# Patient Record
Sex: MALE | Race: WHITE | NOT HISPANIC OR LATINO | URBAN - METROPOLITAN AREA
[De-identification: names, ages, dates, MRNs, and addresses within clinical notes are randomized per-mention and may not be internally consistent; named-entity substitution may affect disease eponyms.]

---

## 2021-05-02 ENCOUNTER — HOSPITAL ENCOUNTER (OUTPATIENT)
Dept: RADIOLOGY | Facility: MEDICAL CENTER | Age: 71
End: 2021-05-02
Attending: FAMILY MEDICINE
Payer: COMMERCIAL

## 2021-05-02 DIAGNOSIS — K40.90 UNILATERAL INGUINAL HERNIA WITHOUT OBSTRUCTION OR GANGRENE, RECURRENCE NOT SPECIFIED: ICD-10-CM

## 2021-05-02 PROCEDURE — 76857 US EXAM PELVIC LIMITED: CPT

## 2021-09-21 ENCOUNTER — HOSPITAL ENCOUNTER (OUTPATIENT)
Dept: LAB | Facility: MEDICAL CENTER | Age: 71
End: 2021-09-21
Attending: FAMILY MEDICINE
Payer: COMMERCIAL

## 2021-09-21 LAB
ALBUMIN SERPL BCP-MCNC: 4.5 G/DL (ref 3.2–4.9)
ALBUMIN/GLOB SERPL: 1.6 G/DL
ALP SERPL-CCNC: 104 U/L (ref 30–99)
ALT SERPL-CCNC: 14 U/L (ref 2–50)
ANION GAP SERPL CALC-SCNC: 12 MMOL/L (ref 7–16)
AST SERPL-CCNC: 22 U/L (ref 12–45)
BILIRUB SERPL-MCNC: 1.3 MG/DL (ref 0.1–1.5)
BUN SERPL-MCNC: 13 MG/DL (ref 8–22)
CALCIUM SERPL-MCNC: 9.7 MG/DL (ref 8.5–10.5)
CHLORIDE SERPL-SCNC: 100 MMOL/L (ref 96–112)
CHOLEST SERPL-MCNC: 177 MG/DL (ref 100–199)
CO2 SERPL-SCNC: 26 MMOL/L (ref 20–33)
CREAT SERPL-MCNC: 0.57 MG/DL (ref 0.5–1.4)
FASTING STATUS PATIENT QL REPORTED: NORMAL
GLOBULIN SER CALC-MCNC: 2.9 G/DL (ref 1.9–3.5)
GLUCOSE SERPL-MCNC: 93 MG/DL (ref 65–99)
HDLC SERPL-MCNC: 36 MG/DL
LDLC SERPL CALC-MCNC: 119 MG/DL
POTASSIUM SERPL-SCNC: 3.9 MMOL/L (ref 3.6–5.5)
PROT SERPL-MCNC: 7.4 G/DL (ref 6–8.2)
SODIUM SERPL-SCNC: 138 MMOL/L (ref 135–145)
TRIGL SERPL-MCNC: 112 MG/DL (ref 0–149)

## 2021-09-21 PROCEDURE — 80061 LIPID PANEL: CPT

## 2021-09-21 PROCEDURE — 80053 COMPREHEN METABOLIC PANEL: CPT

## 2021-09-21 PROCEDURE — 36415 COLL VENOUS BLD VENIPUNCTURE: CPT

## 2022-09-07 ENCOUNTER — HOSPITAL ENCOUNTER (OUTPATIENT)
Dept: LAB | Facility: MEDICAL CENTER | Age: 72
End: 2022-09-07
Attending: FAMILY MEDICINE
Payer: COMMERCIAL

## 2022-09-07 LAB
ALBUMIN SERPL BCP-MCNC: 4.6 G/DL (ref 3.2–4.9)
ALBUMIN/GLOB SERPL: 1.8 G/DL
ALP SERPL-CCNC: 114 U/L (ref 30–99)
ALT SERPL-CCNC: 14 U/L (ref 2–50)
ANION GAP SERPL CALC-SCNC: 8 MMOL/L (ref 7–16)
AST SERPL-CCNC: 19 U/L (ref 12–45)
BILIRUB SERPL-MCNC: 1.6 MG/DL (ref 0.1–1.5)
BUN SERPL-MCNC: 17 MG/DL (ref 8–22)
CALCIUM SERPL-MCNC: 9.3 MG/DL (ref 8.5–10.5)
CHLORIDE SERPL-SCNC: 103 MMOL/L (ref 96–112)
CHOLEST SERPL-MCNC: 164 MG/DL (ref 100–199)
CO2 SERPL-SCNC: 28 MMOL/L (ref 20–33)
CREAT SERPL-MCNC: 0.64 MG/DL (ref 0.5–1.4)
FASTING STATUS PATIENT QL REPORTED: NORMAL
GFR SERPLBLD CREATININE-BSD FMLA CKD-EPI: 101 ML/MIN/1.73 M 2
GLOBULIN SER CALC-MCNC: 2.5 G/DL (ref 1.9–3.5)
GLUCOSE SERPL-MCNC: 111 MG/DL (ref 65–99)
HDLC SERPL-MCNC: 39 MG/DL
LDLC SERPL CALC-MCNC: 100 MG/DL
POTASSIUM SERPL-SCNC: 4.2 MMOL/L (ref 3.6–5.5)
PROT SERPL-MCNC: 7.1 G/DL (ref 6–8.2)
SODIUM SERPL-SCNC: 139 MMOL/L (ref 135–145)
TRIGL SERPL-MCNC: 126 MG/DL (ref 0–149)

## 2022-09-07 PROCEDURE — 80061 LIPID PANEL: CPT

## 2022-09-07 PROCEDURE — 36415 COLL VENOUS BLD VENIPUNCTURE: CPT

## 2022-09-07 PROCEDURE — 80053 COMPREHEN METABOLIC PANEL: CPT

## 2023-06-30 ENCOUNTER — HOSPITAL ENCOUNTER (OUTPATIENT)
Dept: LAB | Facility: MEDICAL CENTER | Age: 73
End: 2023-06-30
Attending: FAMILY MEDICINE
Payer: COMMERCIAL

## 2023-06-30 LAB
ALBUMIN SERPL BCP-MCNC: 4.2 G/DL (ref 3.2–4.9)
ALBUMIN/GLOB SERPL: 1.6 G/DL
ALP SERPL-CCNC: 106 U/L (ref 30–99)
ALT SERPL-CCNC: 14 U/L (ref 2–50)
ANION GAP SERPL CALC-SCNC: 11 MMOL/L (ref 7–16)
AST SERPL-CCNC: 17 U/L (ref 12–45)
BILIRUB SERPL-MCNC: 1.9 MG/DL (ref 0.1–1.5)
BUN SERPL-MCNC: 22 MG/DL (ref 8–22)
CALCIUM ALBUM COR SERPL-MCNC: 8.9 MG/DL (ref 8.5–10.5)
CALCIUM SERPL-MCNC: 9.1 MG/DL (ref 8.5–10.5)
CHLORIDE SERPL-SCNC: 105 MMOL/L (ref 96–112)
CHOLEST SERPL-MCNC: 156 MG/DL (ref 100–199)
CO2 SERPL-SCNC: 24 MMOL/L (ref 20–33)
CREAT SERPL-MCNC: 0.75 MG/DL (ref 0.5–1.4)
FASTING STATUS PATIENT QL REPORTED: NORMAL
GFR SERPLBLD CREATININE-BSD FMLA CKD-EPI: 95 ML/MIN/1.73 M 2
GLOBULIN SER CALC-MCNC: 2.7 G/DL (ref 1.9–3.5)
GLUCOSE SERPL-MCNC: 119 MG/DL (ref 65–99)
HDLC SERPL-MCNC: 35 MG/DL
LDLC SERPL CALC-MCNC: 103 MG/DL
POTASSIUM SERPL-SCNC: 4 MMOL/L (ref 3.6–5.5)
PROT SERPL-MCNC: 6.9 G/DL (ref 6–8.2)
SODIUM SERPL-SCNC: 140 MMOL/L (ref 135–145)
TRIGL SERPL-MCNC: 89 MG/DL (ref 0–149)

## 2023-06-30 PROCEDURE — 80053 COMPREHEN METABOLIC PANEL: CPT

## 2023-06-30 PROCEDURE — 80061 LIPID PANEL: CPT

## 2023-06-30 PROCEDURE — 36415 COLL VENOUS BLD VENIPUNCTURE: CPT

## 2023-07-17 PROBLEM — M18.12 PRIMARY OSTEOARTHRITIS OF FIRST CARPOMETACARPAL JOINT OF LEFT HAND: Status: ACTIVE | Noted: 2023-07-17

## 2023-07-17 PROBLEM — M19.042 OSTEOARTHRITIS OF LEFT HAND: Status: ACTIVE | Noted: 2023-07-17

## 2023-07-17 PROBLEM — M65.4 DE QUERVAIN'S TENOSYNOVITIS, LEFT: Status: ACTIVE | Noted: 2023-07-17

## 2024-07-02 ENCOUNTER — HOSPITAL ENCOUNTER (OUTPATIENT)
Dept: LAB | Facility: MEDICAL CENTER | Age: 74
End: 2024-07-02
Attending: FAMILY MEDICINE
Payer: COMMERCIAL

## 2024-07-02 LAB
ALBUMIN SERPL BCP-MCNC: 4.3 G/DL (ref 3.2–4.9)
ALBUMIN/GLOB SERPL: 1.5 G/DL
ALP SERPL-CCNC: 100 U/L (ref 30–99)
ALT SERPL-CCNC: 16 U/L (ref 2–50)
ANION GAP SERPL CALC-SCNC: 11 MMOL/L (ref 7–16)
AST SERPL-CCNC: 18 U/L (ref 12–45)
BILIRUB SERPL-MCNC: 1.3 MG/DL (ref 0.1–1.5)
BUN SERPL-MCNC: 15 MG/DL (ref 8–22)
CALCIUM ALBUM COR SERPL-MCNC: 9.2 MG/DL (ref 8.5–10.5)
CALCIUM SERPL-MCNC: 9.4 MG/DL (ref 8.5–10.5)
CHLORIDE SERPL-SCNC: 104 MMOL/L (ref 96–112)
CHOLEST SERPL-MCNC: 171 MG/DL (ref 100–199)
CO2 SERPL-SCNC: 25 MMOL/L (ref 20–33)
CREAT SERPL-MCNC: 0.69 MG/DL (ref 0.5–1.4)
FASTING STATUS PATIENT QL REPORTED: NORMAL
GFR SERPLBLD CREATININE-BSD FMLA CKD-EPI: 97 ML/MIN/1.73 M 2
GLOBULIN SER CALC-MCNC: 2.8 G/DL (ref 1.9–3.5)
GLUCOSE SERPL-MCNC: 110 MG/DL (ref 65–99)
HDLC SERPL-MCNC: 47 MG/DL
LDLC SERPL CALC-MCNC: 108 MG/DL
POTASSIUM SERPL-SCNC: 3.9 MMOL/L (ref 3.6–5.5)
PROT SERPL-MCNC: 7.1 G/DL (ref 6–8.2)
SODIUM SERPL-SCNC: 140 MMOL/L (ref 135–145)
TRIGL SERPL-MCNC: 80 MG/DL (ref 0–149)

## 2024-07-02 PROCEDURE — 36415 COLL VENOUS BLD VENIPUNCTURE: CPT

## 2024-07-02 PROCEDURE — 80053 COMPREHEN METABOLIC PANEL: CPT

## 2024-07-02 PROCEDURE — 80061 LIPID PANEL: CPT

## 2024-08-24 ENCOUNTER — OFFICE VISIT (OUTPATIENT)
Dept: URGENT CARE | Facility: PHYSICIAN GROUP | Age: 74
End: 2024-08-24
Payer: COMMERCIAL

## 2024-08-24 VITALS
OXYGEN SATURATION: 94 % | BODY MASS INDEX: 27.22 KG/M2 | SYSTOLIC BLOOD PRESSURE: 142 MMHG | DIASTOLIC BLOOD PRESSURE: 86 MMHG | HEIGHT: 69 IN | TEMPERATURE: 96.7 F | HEART RATE: 64 BPM | WEIGHT: 183.8 LBS | RESPIRATION RATE: 16 BRPM

## 2024-08-24 DIAGNOSIS — R04.0 EPISTAXIS: ICD-10-CM

## 2024-08-24 DIAGNOSIS — J34.89 NOSE DRYNESS: ICD-10-CM

## 2024-08-24 PROCEDURE — 99203 OFFICE O/P NEW LOW 30 MIN: CPT | Performed by: FAMILY MEDICINE

## 2024-08-24 PROCEDURE — 3077F SYST BP >= 140 MM HG: CPT | Performed by: FAMILY MEDICINE

## 2024-08-24 PROCEDURE — 3079F DIAST BP 80-89 MM HG: CPT | Performed by: FAMILY MEDICINE

## 2024-08-24 RX ORDER — BACITRACIN ZINC AND POLYMYXIN B SULFATE 500; 1000 [USP'U]/G; [USP'U]/G
OINTMENT TOPICAL ONCE
Status: COMPLETED | OUTPATIENT
Start: 2024-08-24 | End: 2024-08-24

## 2024-08-24 RX ADMIN — BACITRACIN ZINC AND POLYMYXIN B SULFATE 1 EACH: 500; 1000 OINTMENT TOPICAL at 09:59

## 2024-08-24 ASSESSMENT — ENCOUNTER SYMPTOMS
DIZZINESS: 0
FEVER: 0
COUGH: 0
SHORTNESS OF BREATH: 0
MYALGIAS: 0
SORE THROAT: 0
VOMITING: 0
CHILLS: 0
NAUSEA: 0

## 2024-08-24 NOTE — PROGRESS NOTES
Subjective:   Isaias Kim is a 74 y.o. male who presents for Epistaxis (Left nostril, got dizzy x last night )        Epistaxis   The bleeding has been from the left (Reports nosebleed, onset previous night, initially resolved then recurred this morning then resolved second time, no active bleeding at this time) nare. This is a new problem. The current episode started yesterday. The problem has been waxing and waning. The bleeding is associated with dry air. He has tried nothing for the symptoms.     PMH:  has no past medical history on file.  MEDS:   Current Outpatient Medications:     amlodipine-valsartan (EXFORGE)  MG per tablet, Take 1 Tablet by mouth every day., Disp: , Rfl:     propranolol CR (INDERAL LA) 160 MG CAPSULE SR 24 HR capsule, Take 1 Capsule by mouth every day., Disp: , Rfl:     tamsulosin (FLOMAX) 0.4 MG capsule, Take 1 Capsule by mouth every day., Disp: , Rfl:     amlodipine-valsartan (EXFORGE)  MG per tablet, , Disp: , Rfl:     propranolol CR (INDERAL LA) 160 MG CAPSULE SR 24 HR capsule, , Disp: , Rfl:     tamsulosin (FLOMAX) 0.4 MG capsule, Take 0.4 mg by mouth every day. (Patient not taking: Reported on 8/24/2024), Disp: , Rfl:     Current Facility-Administered Medications:     bacitracin-polymyxin b (Polysporin) 500-32023 UNIT/GM ointment, , Topical, Once,   ALLERGIES: No Known Allergies  SURGHX: History reviewed. No pertinent surgical history.  SOCHX:  reports that he has never smoked. He has never been exposed to tobacco smoke. He has never used smokeless tobacco.  FH: History reviewed. No pertinent family history.  Review of Systems   Constitutional:  Negative for chills and fever.   HENT:  Positive for nosebleeds. Negative for sore throat.    Respiratory:  Negative for cough and shortness of breath.    Gastrointestinal:  Negative for nausea and vomiting.   Musculoskeletal:  Negative for myalgias.   Skin:  Negative for rash.   Neurological:  Negative for dizziness.         "Objective:   BP (!) 142/86   Pulse 64   Temp 35.9 °C (96.7 °F) (Temporal)   Resp 16   Ht 1.753 m (5' 9\")   Wt 83.4 kg (183 lb 12.8 oz)   SpO2 94%   BMI 27.14 kg/m²   Physical Exam  Vitals and nursing note reviewed.   Constitutional:       General: He is not in acute distress.     Appearance: He is well-developed.   HENT:      Head: Normocephalic and atraumatic.      Right Ear: External ear normal.      Left Ear: External ear normal.      Nose: Nose normal.      Right Nostril: No epistaxis or septal hematoma.      Left Nostril: No epistaxis or septal hematoma.      Comments: Left friable mucosa noted, no active bleeding, dried eschar intact     Mouth/Throat:      Mouth: Mucous membranes are moist.   Eyes:      Conjunctiva/sclera: Conjunctivae normal.   Cardiovascular:      Rate and Rhythm: Normal rate.   Pulmonary:      Effort: Pulmonary effort is normal. No respiratory distress.      Breath sounds: Normal breath sounds.   Abdominal:      General: There is no distension.   Musculoskeletal:         General: Normal range of motion.   Skin:     General: Skin is warm and dry.   Neurological:      General: No focal deficit present.      Mental Status: He is alert and oriented to person, place, and time. Mental status is at baseline.      Gait: Gait (gait at baseline) normal.   Psychiatric:         Judgment: Judgment normal.           Assessment/Plan:   1. Epistaxis  - bacitracin-polymyxin b (Polysporin) 500-01042 UNIT/GM ointment    2. Nose dryness  - bacitracin-polymyxin b (Polysporin) 500-13914 UNIT/GM ointment        Medical Decision Making/Course:  In the course of preparing for this visit with review of the pertinent past medical history, recent and past clinic visits, current medications, and performing chart, immunization, medical history and medication reconciliation, and in the further course of obtaining the current history pertinent to the clinic visit today, performing an exam and evaluation, ordering " and independently evaluating labs, tests  , and/or procedures, prescribing any recommended new medications as noted above including application of antibiotic ointment to the left naris with instruction and education on application to repeat at home each evening, providing any pertinent counseling and education and recommending further coordination of care including recommendations for symptomatic and supportive measures, at least  18 minutes of total time were spent during this encounter.      Discussed close monitoring, return precautions, and supportive measures of maintaining adequate fluid hydration and caloric intake, relative rest and symptom management as needed for pain and/or fever.    Differential diagnosis, natural history, supportive care, and indications for immediate follow-up discussed.     Advised the patient to follow-up with the primary care physician for recheck, reevaluation, and consideration of further management.    Please note that this dictation was created using voice recognition software. I have worked with consultants from the vendor as well as technical experts from [facility] [employer] to optimize the interface. I have made every reasonable attempt to correct obvious errors, but I expect that there are errors of grammar and possibly content that I did not discover before finalizing the note.

## 2024-08-28 ENCOUNTER — HOSPITAL ENCOUNTER (EMERGENCY)
Facility: MEDICAL CENTER | Age: 74
End: 2024-08-28
Attending: EMERGENCY MEDICINE
Payer: COMMERCIAL

## 2024-08-28 VITALS
BODY MASS INDEX: 26.74 KG/M2 | SYSTOLIC BLOOD PRESSURE: 156 MMHG | RESPIRATION RATE: 14 BRPM | WEIGHT: 180.56 LBS | DIASTOLIC BLOOD PRESSURE: 68 MMHG | HEART RATE: 70 BPM | OXYGEN SATURATION: 94 % | HEIGHT: 69 IN | TEMPERATURE: 98.2 F

## 2024-08-28 DIAGNOSIS — R04.0 EPISTAXIS: ICD-10-CM

## 2024-08-28 PROCEDURE — 303620 HCHG EPISTAXIS CONTROL

## 2024-08-28 PROCEDURE — 700102 HCHG RX REV CODE 250 W/ 637 OVERRIDE(OP): Performed by: EMERGENCY MEDICINE

## 2024-08-28 PROCEDURE — A9270 NON-COVERED ITEM OR SERVICE: HCPCS | Performed by: EMERGENCY MEDICINE

## 2024-08-28 PROCEDURE — 99284 EMERGENCY DEPT VISIT MOD MDM: CPT

## 2024-08-28 PROCEDURE — 700101 HCHG RX REV CODE 250: Performed by: EMERGENCY MEDICINE

## 2024-08-28 RX ORDER — LIDOCAINE HYDROCHLORIDE AND EPINEPHRINE 10; 10 MG/ML; UG/ML
20 INJECTION, SOLUTION INFILTRATION; PERINEURAL ONCE
Status: COMPLETED | OUTPATIENT
Start: 2024-08-28 | End: 2024-08-28

## 2024-08-28 RX ORDER — AMOXICILLIN 500 MG/1
500 CAPSULE ORAL ONCE
Status: COMPLETED | OUTPATIENT
Start: 2024-08-28 | End: 2024-08-28

## 2024-08-28 RX ORDER — ACETAMINOPHEN 500 MG
1000 TABLET ORAL ONCE
Status: COMPLETED | OUTPATIENT
Start: 2024-08-28 | End: 2024-08-28

## 2024-08-28 RX ORDER — IBUPROFEN 200 MG
400 TABLET ORAL ONCE
Status: COMPLETED | OUTPATIENT
Start: 2024-08-28 | End: 2024-08-28

## 2024-08-28 RX ORDER — AMOXICILLIN 500 MG/1
500 CAPSULE ORAL 3 TIMES DAILY
Qty: 15 CAPSULE | Refills: 0 | Status: ACTIVE | OUTPATIENT
Start: 2024-08-28 | End: 2024-09-02

## 2024-08-28 RX ADMIN — LIDOCAINE HYDROCHLORIDE AND EPINEPHRINE 20 ML: 10; 10 INJECTION, SOLUTION INFILTRATION; PERINEURAL at 15:00

## 2024-08-28 RX ADMIN — ACETAMINOPHEN 1000 MG: 500 TABLET ORAL at 15:56

## 2024-08-28 RX ADMIN — AMOXICILLIN 500 MG: 500 CAPSULE ORAL at 15:56

## 2024-08-28 RX ADMIN — IBUPROFEN 400 MG: 200 TABLET, FILM COATED ORAL at 15:56

## 2024-08-28 NOTE — ED PROVIDER NOTES
"ER Provider Note    Scribed for Isaias Flores M.D. by Radha Reyes. 8/28/2024   2:32 PM    Primary Care Provider: Zaid Rodriguez D.O.    CHIEF COMPLAINT  Chief Complaint   Patient presents with    Epistaxis     Pt states they started to have nose bleed Friday night 8/23, PCP sent to ED to be seen by ENT. Pt has been using afrin     Pt denies trauma and blood thinners    Dizziness     Pt states dizziness started this morning      EXTERNAL RECORDS REVIEWED  Outpatient Notes The patient was seen by PCP 8/24/24 for a nosebleed.    HPI/ROS  LIMITATION TO HISTORY   Select: : None  OUTSIDE HISTORIAN(S):  None    Isaias Kim is a 74 y.o. male who presents to the ED for evaluation of epistaxis onset 5 days ago. The patient reports that his nosebleed started on Friday and he went to urgent care on Saturday for evaluation. He notes that they treated him with an \"ointment\" but his nosebleed returned on Monday prompting him to present to his PCP, Dr. Rodriguez. He reports that his PCP cauterized his nose 4 times but shortly after his nosebleed started again. He then presented to the ED at [facility] Tuesday where his nose was cauterized again. He presents to the ED today for continued bleeding and to hopefully consult with an ENT. He describes his nose bleeds as being heavy in nature filling \"rags full\" of blood. The patient states that after a few of his heavier nosebleeds he has had associated dizziness. He notes that he has tried nose clamps, saline rinses, Vaseline, and everything that has been recommended to him with no alleviation. 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 No alleviating or exacerbating factors were noted. He denies taking any blood thinners.     PAST MEDICAL HISTORY  History reviewed. No pertinent past medical history.    SURGICAL HISTORY  History reviewed. No pertinent surgical history.    FAMILY HISTORY  History reviewed. No pertinent family history.    SOCIAL " "HISTORY   reports that he has never smoked. He has never been exposed to tobacco smoke. He has never used smokeless tobacco. He reports that he does not currently use alcohol after a past usage of about 2.4 oz of alcohol per week. He reports that he does not use drugs.    CURRENT MEDICATIONS  Current Outpatient Medications   Medication Instructions    amlodipine-valsartan (EXFORGE)  MG per tablet 1 Tablet, Oral, DAILY    amlodipine-valsartan (EXFORGE)  MG per tablet No dose, route, or frequency recorded.    propranolol CR (INDERAL LA) 160 MG CAPSULE SR 24 HR capsule 1 Capsule, Oral, DAILY    propranolol CR (INDERAL LA) 160 MG CAPSULE SR 24 HR capsule No dose, route, or frequency recorded.    tamsulosin (FLOMAX) 0.4 MG capsule 1 Capsule, Oral, DAILY    tamsulosin (FLOMAX) 0.4 mg, DAILY      ALLERGIES  No Known Allergies     PHYSICAL EXAM  BP (!) 158/77   Pulse 84   Temp 36.7 °C (98.1 °F) (Temporal)   Resp 16   Ht 1.753 m (5' 9\")   Wt 81.9 kg (180 lb 8.9 oz)   SpO2 95%   BMI 26.66 kg/m²      Constitutional: No acute distress    HENT: Normocephalic, Atraumatic.  Oropharynx moist. Left nare with dried blood and previous area of cauterization on the anterior septum.   Eyes: EOMI, Conjunctiva normal, No discharge.   CV: Good pulses  Thorax & Lungs: No respiratory distress.   Skin: Warm, Dry, No rash noted    Musculoskeletal: No major deformities noted.   Neurologic: Awake, alert. Moves all extremities spontaneously.  Psychiatric: Affect normal, Mood normal.     DIAGNOSTIC STUDIES    Epistaxis Procedure Note    Indication: Bleeding    Pre-medication: lidocaine    Procedure: The patient was positioned appropriately and the nares were cleared as well as possible. The bleeding site was localized to the left nare in the septal region and tamponaded with an anterior rapid Rhino nasal pack .  Hemostasis was obtained.    The patient tolerated the procedure well.    Complications: None     COURSE & MEDICAL " DECISION MAKING     INITIAL ASSESSMENT, COURSE AND PLAN    Care Narrative: Patient with recurrent epistaxis who has had cautery twice.  I elected to pack the nose given his reoccurrence and him wanting to work.  I placed lidocaine 1% with epinephrine on cotton swabs and inserted into the nare, this caused it to bleed more then I packed it with a balloon packing.  The patient tolerated procedure well.  I did not get laboratory test because the patient states that he just had intermittent bleeding, he does not think that he bled enough to be anemic.  I believe the patient to be discharged home, will cover the patient with antibiotics, have the patient return in the next 3 to 4 days for packing removal.  I have taught him how to adjust the balloon.  Have the patient return with worsening symptoms.    2:32 PM - Patient was evaluated at bedside. Patient verbalizes understanding and support with my plan of care. I informed the patient that the next step is to add packing to the nose for 3 days and see if the is adequate in stopping the nose bleed.     2:58 PM - I treated the patient's epistaxis at this time as detailed above.     4:38 PM - Patient reevaluated at bedside. He feels improved. Discussed plan for discharge, including plan for follow-up, and informed them to return to the [facility] ED with any new or worsening symptoms. Patient was given the opportunity for questions, and I addressed all questions or concerns. He is stable for discharge at this time. Patient verbalizes understanding and support with my plan for discharge.    DISPOSITION AND DISCUSSIONS    I have discussed management of the patient with the following physicians and CHENCHO's:  None    Discussion of management with other [facility] or appropriate source(s): None     Escalation of care considered, and ultimately not performed: Laboratory analysis.    Barriers to care at this time, including but not limited to:  None .     The patient will return for new or  worsening symptoms and is stable at the time of discharge.    The patient is referred to a primary physician for blood pressure management, diabetic screening, and for all other preventative health concerns.    DISPOSITION:  Patient will be discharged home in stable condition.    FOLLOW UP:  [facility], Emergency Dept  [address]  [place] [id number] [phone]    Return on Saturday afternoon or Sunday morning for packing removal, return with worsening bleeding.      OUTPATIENT MEDICATIONS:  Discharge Medication List as of 8/28/2024  4:05 PM        START taking these medications    Details   amoxicillin (AMOXIL) 500 MG Cap Take 1 Capsule by mouth 3 times a day for 5 days., Disp-15 Capsule, R-0, Normal              FINAL DIAGNOSIS  1. Epistaxis      2.   Epistaxis procedure     Radha SCHERER (Scribnatalee), am scribing for, and in the presence of, Isaias Flores M.D..    Electronically signed by: Radha Reyes ([place]), 8/28/2024    IIsaias M.D. personally performed the services described in this documentation, as scribed by Radha Reyes in my presence, and it is both accurate and complete.      The note accurately reflects work and decisions made by me.  Isaias Flores M.D.  8/28/2024  9:04 PM

## 2024-08-28 NOTE — ED TRIAGE NOTES
".  Chief Complaint   Patient presents with    Epistaxis     Pt states they started to have nose bleed Friday night 8/23, PCP sent to ED to be seen by ENT. Pt has been using afrin     Pt denies trauma and blood thinners    Dizziness     Pt states dizziness started this morning    Patient ambulatory to triage for above complaint. Patient aware to notify RN of any changes in symptoms. Patient educated on triage process. A&O x 4 and placed in lobby    .BP (!) 155/83   Pulse 84   Temp 36.7 °C (98.1 °F) (Temporal)   Resp 16   Ht 1.753 m (5' 9\")   Wt 81.9 kg (180 lb 8.9 oz)   SpO2 96%   BMI 26.66 kg/m²     "

## 2024-08-28 NOTE — ED NOTES
Pt has discharge orders. Pt educated on discharge instructions and new prescriptions.  Pt educated to follow up on Saturday or Sunday for packing removal.  Pt also educated that a referral was sent for ENT and pt encouraged to call for appointment. Pt verbalizes understanding. Pt ambulatory to lobby with steady gait. Pt going home with wife.

## 2024-08-28 NOTE — DISCHARGE INSTRUCTIONS
Continue to try to keep the balloon moist with saline nasal spray and Afrin.  Try to follow-up with ENT, return on Saturday afternoon or Sunday morning for packing removal

## 2024-08-31 ENCOUNTER — HOSPITAL ENCOUNTER (EMERGENCY)
Facility: MEDICAL CENTER | Age: 74
End: 2024-08-31
Attending: EMERGENCY MEDICINE
Payer: COMMERCIAL

## 2024-08-31 VITALS
HEIGHT: 69 IN | HEART RATE: 59 BPM | OXYGEN SATURATION: 95 % | RESPIRATION RATE: 16 BRPM | SYSTOLIC BLOOD PRESSURE: 148 MMHG | BODY MASS INDEX: 26.78 KG/M2 | DIASTOLIC BLOOD PRESSURE: 71 MMHG | WEIGHT: 180.78 LBS | TEMPERATURE: 97.6 F

## 2024-08-31 DIAGNOSIS — R04.0 EPISTAXIS: ICD-10-CM

## 2024-08-31 PROCEDURE — 99282 EMERGENCY DEPT VISIT SF MDM: CPT

## 2024-08-31 ASSESSMENT — LIFESTYLE VARIABLES: DO YOU DRINK ALCOHOL: NO

## 2024-08-31 NOTE — ED PROVIDER NOTES
"ED Provider Note    CHIEF COMPLAINT  Chief Complaint   Patient presents with    Other     Pt has nasal balloon in place, was told to come back for re eval possible removal.        EXTERNAL RECORDS REVIEWED  Other patient seen in the emergency department 3 days ago and had nasal packing placed    HPI/ROS  LIMITATION TO HISTORY   Select: : None  OUTSIDE HISTORIAN(S):  Wife    Isaias Kim is a 74 y.o. male who presents stating that he was here 3 days ago with left nare epistaxis, at that time a Rhino Rocket was placed.  He said it bled yesterday and his wife was able to add some air to the balloon and that resolved the bleeding and then this morning had a small amount of oozing.    PAST MEDICAL HISTORY   The patient denies    SURGICAL HISTORY  patient denies any surgical history    FAMILY HISTORY  No family history on file.    SOCIAL HISTORY  Social History     Tobacco Use    Smoking status: Never     Passive exposure: Never    Smokeless tobacco: Never   Vaping Use    Vaping status: Never Used   Substance and Sexual Activity    Alcohol use: Not Currently     Alcohol/week: 2.4 oz     Types: 4 Shots of liquor per week    Drug use: Never    Sexual activity: Not on file       CURRENT MEDICATIONS  Home Medications       Reviewed by Louis Ellis R.N. (Registered Nurse) on 08/31/24 at 1316  Med List Status: Not Addressed     Medication Last Dose Status   amlodipine-valsartan (EXFORGE)  MG per tablet  Active   amlodipine-valsartan (EXFORGE)  MG per tablet  Active   amoxicillin (AMOXIL) 500 MG Cap  Active   propranolol CR (INDERAL LA) 160 MG CAPSULE SR 24 HR capsule  Active   propranolol CR (INDERAL LA) 160 MG CAPSULE SR 24 HR capsule  Active   tamsulosin (FLOMAX) 0.4 MG capsule  Active   tamsulosin (FLOMAX) 0.4 MG capsule  Active                    ALLERGIES  No Known Allergies    PHYSICAL EXAM  VITAL SIGNS: BP (!) 145/108   Pulse 61   Temp 36.4 °C (97.6 °F) (Temporal)   Resp 16   Ht 1.753 m (5' 9\")   " Wt 82 kg (180 lb 12.4 oz)   SpO2 97%   BMI 26.70 kg/m²      Nose: There is currently no active bleeding, Rhino Rocket is in place left nostril.          COURSE & MEDICAL DECISION MAKING    ASSESSMENT, COURSE AND PLAN  Care Narrative:     Patient presents with nasal packing in place.  He had some bleeding yesterday and some oozing today.  I have encouraged the patient to keep the packing in for at least 2 more days.  I did give him the option to have me remove it but I might have to repack the nose.  He agrees at this time to keep the packing in for 2 more days and return.  My concern is that the clot has not formed.  I have encouraged the patient to use Ponaris to moisten his nose after these nosebleeds resolved to prevent them.          ADDITIONAL PROBLEMS MANAGED  Epistaxis    DISPOSITION AND DISCUSSIONS  I have discussed management of the patient with the following physicians and CHENCHO's: None    Discussion of management with other [facility] or appropriate source(s): None     Escalation of care considered, and ultimately not performed:Laboratory analysis    Barriers to care at this time, including but not limited to:  None .     Decision tools and prescription drugs considered including, but not limited to:  instructions to return in 2 days for packing removal .       The patient will return for new or worsening symptoms and is stable at the time of discharge.    The patient is referred to a primary physician for blood pressure management, diabetic screening, and for all other preventative health concerns.        DISPOSITION:  Patient will be discharged home in stable condition.    FOLLOW UP:  [facility], Emergency Dept  [address] [id number] [phone]    If symptoms worsen, and for packing removal in 2 to 3 days from today      OUTPATIENT MEDICATIONS:  New Prescriptions    No medications on file         FINAL DIAGNOSIS  1. Epistaxis         Electronically signed by: Juan EID  Rio Briceno M.D., 8/31/2024 2:16 PM

## 2024-08-31 NOTE — ED NOTES
Dsicharged home with written and verbal education to return in 2-3 days for recheck. Educated to return for increased bleeding and/or of infection. He is ambulatory on DC.

## 2024-08-31 NOTE — ED TRIAGE NOTES
Chief Complaint   Patient presents with    Other     Pt has nasal balloon in place, was told to come back for re eval possible removal.      Explained to pt triage process, made pt aware to tell this RN/staff of any changes/concerns, pt verbalized understanding of process and instructions given. Pt to ER lobby.

## 2024-09-03 ENCOUNTER — HOSPITAL ENCOUNTER (EMERGENCY)
Facility: MEDICAL CENTER | Age: 74
End: 2024-09-03
Attending: EMERGENCY MEDICINE
Payer: COMMERCIAL

## 2024-09-03 VITALS
HEIGHT: 69 IN | SYSTOLIC BLOOD PRESSURE: 141 MMHG | RESPIRATION RATE: 18 BRPM | WEIGHT: 183.42 LBS | DIASTOLIC BLOOD PRESSURE: 66 MMHG | OXYGEN SATURATION: 94 % | HEART RATE: 60 BPM | TEMPERATURE: 98.4 F | BODY MASS INDEX: 27.17 KG/M2

## 2024-09-03 DIAGNOSIS — R04.0 EPISTAXIS: ICD-10-CM

## 2024-09-03 PROCEDURE — 99284 EMERGENCY DEPT VISIT MOD MDM: CPT

## 2024-09-03 NOTE — ED TRIAGE NOTES
Chief Complaint   Patient presents with    Nasal Packing Removal     Ambulatory to triage, here for nasal packing removal.  Rhino rocket was placed to L nare on 8/28.  Denies any bleeding since 8/31.

## 2024-09-03 NOTE — DISCHARGE INSTRUCTIONS
Follow the instructions that I gave you, if you get a nosebleed again, placed the nasal clamp on your nose, leaning forward, looking down at your bellybutton.  Hold this position for around 10 minutes.  Take the clamp off, if the bleeding persist bloody nose, apply 2 sprays of Afrin in either nostril, then replaced the clamp look down in your bellybutton again for another 10 minutes.  If bleeding persists then return to the emergency department

## 2024-09-03 NOTE — ED PROVIDER NOTES
"ED Provider Note    CHIEF COMPLAINT  Chief Complaint   Patient presents with    Nasal Packing Removal         HPI/ROS    Isaias Kim is a 74 y.o. male who presents for nasal packing removal.  Patient was seen a week ago for nosebleed, at that time he had a Rhino Rocket placed.  He returned to the emergency department 4 days ago and since there is some ongoing minimal oozing he was told to keep the packing in place and return in 2 to 3 days for removal.  Patient therefore returns today for removal.  He denies any significant ongoing bleeding.    PAST MEDICAL HISTORY       SURGICAL HISTORY  patient denies any surgical history    FAMILY HISTORY  History reviewed. No pertinent family history.    SOCIAL HISTORY  Social History     Tobacco Use    Smoking status: Never     Passive exposure: Never    Smokeless tobacco: Never   Vaping Use    Vaping status: Never Used   Substance and Sexual Activity    Alcohol use: Yes     Alcohol/week: 2.4 oz     Types: 4 Shots of liquor per week     Comment: occ    Drug use: Never    Sexual activity: Not on file       CURRENT MEDICATIONS  Home Medications    **Home medications have not yet been reviewed for this encounter**         ALLERGIES  No Known Allergies    PHYSICAL EXAM  VITAL SIGNS: BP (!) 171/79   Pulse 62   Temp 36.2 °C (97.1 °F) (Temporal)   Resp 16   Ht 1.753 m (5' 9\")   Wt 83.2 kg (183 lb 6.8 oz)   SpO2 94%   BMI 27.09 kg/m²    Physical Exam  Constitutional:       Appearance: Normal appearance.   HENT:      Nose:      Comments: Nasal packing in place in patient's left nare.  Nasal packing was removed revealing a mildly hyperemic narrowed without significant exudate, desquamation, lesions otherwise or active bleeding     Mouth/Throat:      Mouth: Mucous membranes are moist.   Pulmonary:      Effort: Pulmonary effort is normal.   Neurological:      General: No focal deficit present.      Mental Status: He is alert and oriented to person, place, and time. "   Psychiatric:         Mood and Affect: Mood normal.           COURSE & MEDICAL DECISION MAKING    ASSESSMENT, COURSE AND PLAN  Care Narrative: Nasal packing removed without issue.  Patient tolerated well.  Patient monitored in the emergency department for quite some time after removal and no bleeding occurred.  Patient remained without any complaints.  Home care reviewed.  Patient has Afrin at home.  Return precautions reviewed            Escalation of care considered, and ultimately not performed:Laboratory analysis was deferred in this well-appearing patient without any evidence of severe anemia or easy bruising        FINAL DIAGNOSIS  1. Epistaxis

## 2024-09-03 NOTE — ED NOTES
Pt given d/c instructions, f/u info with verbal understanding.  VSS at discharge. Pt ambulatory from the ED w/ steady gait.  All belongings in possession on discharge.  Pt escorted to the lobby by RN.

## 2025-06-02 ENCOUNTER — HOSPITAL ENCOUNTER (OUTPATIENT)
Dept: LAB | Facility: MEDICAL CENTER | Age: 75
End: 2025-06-02
Attending: FAMILY MEDICINE
Payer: COMMERCIAL

## 2025-06-02 PROCEDURE — 36415 COLL VENOUS BLD VENIPUNCTURE: CPT

## 2025-06-02 PROCEDURE — 80061 LIPID PANEL: CPT

## 2025-06-02 PROCEDURE — 80053 COMPREHEN METABOLIC PANEL: CPT

## 2025-06-03 LAB
ALBUMIN SERPL BCP-MCNC: 4.2 G/DL (ref 3.2–4.9)
ALBUMIN/GLOB SERPL: 1.6 G/DL
ALP SERPL-CCNC: 91 U/L (ref 30–99)
ALT SERPL-CCNC: 18 U/L (ref 2–50)
ANION GAP SERPL CALC-SCNC: 7 MMOL/L (ref 7–16)
AST SERPL-CCNC: 23 U/L (ref 12–45)
BILIRUB SERPL-MCNC: 0.8 MG/DL (ref 0.1–1.5)
BUN SERPL-MCNC: 15 MG/DL (ref 8–22)
CALCIUM ALBUM COR SERPL-MCNC: 9.1 MG/DL (ref 8.5–10.5)
CALCIUM SERPL-MCNC: 9.3 MG/DL (ref 8.5–10.5)
CHLORIDE SERPL-SCNC: 105 MMOL/L (ref 96–112)
CHOLEST SERPL-MCNC: 169 MG/DL (ref 100–199)
CO2 SERPL-SCNC: 27 MMOL/L (ref 20–33)
CREAT SERPL-MCNC: 0.68 MG/DL (ref 0.5–1.4)
FASTING STATUS PATIENT QL REPORTED: NORMAL
GFR SERPLBLD CREATININE-BSD FMLA CKD-EPI: 97 ML/MIN/1.73 M 2
GLOBULIN SER CALC-MCNC: 2.7 G/DL (ref 1.9–3.5)
GLUCOSE SERPL-MCNC: 113 MG/DL (ref 65–99)
HDLC SERPL-MCNC: 45 MG/DL
LDLC SERPL CALC-MCNC: 108 MG/DL
POTASSIUM SERPL-SCNC: 4.3 MMOL/L (ref 3.6–5.5)
PROT SERPL-MCNC: 6.9 G/DL (ref 6–8.2)
SODIUM SERPL-SCNC: 139 MMOL/L (ref 135–145)
TRIGL SERPL-MCNC: 82 MG/DL (ref 0–149)

## 2025-08-25 ENCOUNTER — HOSPITAL ENCOUNTER (OUTPATIENT)
Dept: LAB | Facility: MEDICAL CENTER | Age: 75
End: 2025-08-25
Payer: COMMERCIAL

## 2025-08-25 LAB
ALBUMIN SERPL BCP-MCNC: 4 G/DL (ref 3.2–4.9)
ALBUMIN/GLOB SERPL: 1.5 G/DL
ALP SERPL-CCNC: 92 U/L (ref 30–99)
ALT SERPL-CCNC: 16 U/L (ref 2–50)
ANION GAP SERPL CALC-SCNC: 12 MMOL/L (ref 7–16)
AST SERPL-CCNC: 22 U/L (ref 12–45)
BASOPHILS # BLD AUTO: 0.9 % (ref 0–1.8)
BASOPHILS # BLD: 0.05 K/UL (ref 0–0.12)
BILIRUB SERPL-MCNC: 1.1 MG/DL (ref 0.1–1.5)
BUN SERPL-MCNC: 20 MG/DL (ref 8–22)
CALCIUM ALBUM COR SERPL-MCNC: 9 MG/DL (ref 8.5–10.5)
CALCIUM SERPL-MCNC: 9 MG/DL (ref 8.5–10.5)
CHLORIDE SERPL-SCNC: 106 MMOL/L (ref 96–112)
CHOLEST SERPL-MCNC: 142 MG/DL (ref 100–199)
CO2 SERPL-SCNC: 21 MMOL/L (ref 20–33)
CREAT SERPL-MCNC: 0.72 MG/DL (ref 0.5–1.4)
EOSINOPHIL # BLD AUTO: 0.37 K/UL (ref 0–0.51)
EOSINOPHIL NFR BLD: 6.9 % (ref 0–6.9)
ERYTHROCYTE [DISTWIDTH] IN BLOOD BY AUTOMATED COUNT: 43.4 FL (ref 35.9–50)
EST. AVERAGE GLUCOSE BLD GHB EST-MCNC: 123 MG/DL
FASTING STATUS PATIENT QL REPORTED: NORMAL
GFR SERPLBLD CREATININE-BSD FMLA CKD-EPI: 95 ML/MIN/1.73 M 2
GLOBULIN SER CALC-MCNC: 2.6 G/DL (ref 1.9–3.5)
GLUCOSE SERPL-MCNC: 110 MG/DL (ref 65–99)
HBA1C MFR BLD: 5.9 % (ref 4–5.6)
HCT VFR BLD AUTO: 46.4 % (ref 42–52)
HDLC SERPL-MCNC: 39 MG/DL
HGB BLD-MCNC: 15.7 G/DL (ref 14–18)
IMM GRANULOCYTES # BLD AUTO: 0.02 K/UL (ref 0–0.11)
IMM GRANULOCYTES NFR BLD AUTO: 0.4 % (ref 0–0.9)
LDLC SERPL CALC-MCNC: 89 MG/DL
LYMPHOCYTES # BLD AUTO: 1.44 K/UL (ref 1–4.8)
LYMPHOCYTES NFR BLD: 26.8 % (ref 22–41)
MCH RBC QN AUTO: 32.2 PG (ref 27–33)
MCHC RBC AUTO-ENTMCNC: 33.8 G/DL (ref 32.3–36.5)
MCV RBC AUTO: 95.3 FL (ref 81.4–97.8)
MONOCYTES # BLD AUTO: 0.68 K/UL (ref 0–0.85)
MONOCYTES NFR BLD AUTO: 12.6 % (ref 0–13.4)
NEUTROPHILS # BLD AUTO: 2.82 K/UL (ref 1.82–7.42)
NEUTROPHILS NFR BLD: 52.4 % (ref 44–72)
NRBC # BLD AUTO: 0 K/UL
NRBC BLD-RTO: 0 /100 WBC (ref 0–0.2)
PLATELET # BLD AUTO: 216 K/UL (ref 164–446)
PMV BLD AUTO: 10.3 FL (ref 9–12.9)
POTASSIUM SERPL-SCNC: 4.5 MMOL/L (ref 3.6–5.5)
PROT SERPL-MCNC: 6.6 G/DL (ref 6–8.2)
PSA SERPL DL<=0.01 NG/ML-MCNC: 2.39 NG/ML (ref 0–4)
RBC # BLD AUTO: 4.87 M/UL (ref 4.7–6.1)
SODIUM SERPL-SCNC: 139 MMOL/L (ref 135–145)
TRIGL SERPL-MCNC: 69 MG/DL (ref 0–149)
WBC # BLD AUTO: 5.4 K/UL (ref 4.8–10.8)

## 2025-08-25 PROCEDURE — 80061 LIPID PANEL: CPT

## 2025-08-25 PROCEDURE — 80053 COMPREHEN METABOLIC PANEL: CPT

## 2025-08-25 PROCEDURE — 83036 HEMOGLOBIN GLYCOSYLATED A1C: CPT | Mod: GA

## 2025-08-25 PROCEDURE — 84153 ASSAY OF PSA TOTAL: CPT | Mod: GA

## 2025-08-25 PROCEDURE — 36415 COLL VENOUS BLD VENIPUNCTURE: CPT

## 2025-08-25 PROCEDURE — 85025 COMPLETE CBC W/AUTO DIFF WBC: CPT
